# Patient Record
Sex: FEMALE | Race: WHITE | NOT HISPANIC OR LATINO | Employment: OTHER | URBAN - METROPOLITAN AREA
[De-identification: names, ages, dates, MRNs, and addresses within clinical notes are randomized per-mention and may not be internally consistent; named-entity substitution may affect disease eponyms.]

---

## 2023-11-06 DIAGNOSIS — K86.9 PANCREATIC LESION (HHS-HCC): Primary | ICD-10-CM

## 2024-01-05 ENCOUNTER — TELEMEDICINE (OUTPATIENT)
Dept: SURGICAL ONCOLOGY | Facility: CLINIC | Age: 79
End: 2024-01-05
Payer: MEDICARE

## 2024-01-05 DIAGNOSIS — D49.0 IPMN (INTRADUCTAL PAPILLARY MUCINOUS NEOPLASM): Primary | ICD-10-CM

## 2024-01-05 PROCEDURE — 99214 OFFICE O/P EST MOD 30 MIN: CPT | Performed by: PHYSICIAN ASSISTANT

## 2024-01-05 NOTE — PROGRESS NOTES
"Subjective   Ms. 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     In brief, her pancreatic cyst was first identified incidentally in 2019. She had an EUS at that time at [facility] and was told the cyst was \"benign.\" She has been under surveillance since that time.    She recently had a surveillance MRI/MRCP at [facility]. Though I ordered the imaging, I was not sent the report. I did obtain the images and reviewed.      She has been doing well. She reports no recent major illnesses or hospitalizations. She denies abdominal pain, early satiety, poor appetite, unintentional weight loss or jaundice.    She is a  non-secretor.      Past medical history: Asthma, GERD, colon polyps, hypercholesterolemia, hypothyroid.   Surgical history: Left rotator cuff repair. No prior abdominal surgeries.   Family history: No pancreatitis or pancreatic cancer.  Social history: Non-smoker. No alcohol use. No illicits.     Objective     There were no vitals taken for this visit.     Physical Exam  A physical exam was not completed as this was a virtual visit. She is in no acute distress.     Assessment/Plan     PLAN: She has a 1.4 cm multiloculated cyst in the neck of the pancreas. There appears to be a smaller/subcentimeter cyst in the tail of the pancreas. 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     I was not sent the radiology report but reviewed the images personally. I see no significant growth, morphologic change or worrisome features such as ductal dilatation.     I reviewed the recent literature with her regarding discontinuation of pancreatic cyst surveillance (age, stability, etc.). After discussion, she elects to continue surveillance which is reasonable. I will contact her in 1 year.     Amairani Lake PA-C  "

## 2024-11-21 DIAGNOSIS — D49.0 IPMN (INTRADUCTAL PAPILLARY MUCINOUS NEOPLASM): Primary | ICD-10-CM

## 2024-12-12 ENCOUNTER — TELEPHONE (OUTPATIENT)
Dept: HEMATOLOGY/ONCOLOGY | Facility: CLINIC | Age: 79
End: 2024-12-12
Payer: MEDICARE

## 2024-12-12 NOTE — TELEPHONE ENCOUNTER
Called patient to schedule annual MRI patient was unavle to reach the phone. Left a voicemail with my information.    Rubina Coronado MA

## 2025-01-31 ENCOUNTER — TELEMEDICINE (OUTPATIENT)
Dept: SURGICAL ONCOLOGY | Facility: CLINIC | Age: 80
End: 2025-01-31
Payer: MEDICARE

## 2025-01-31 DIAGNOSIS — D49.0 IPMN (INTRADUCTAL PAPILLARY MUCINOUS NEOPLASM): Primary | ICD-10-CM

## 2025-01-31 PROCEDURE — 99214 OFFICE O/P EST MOD 30 MIN: CPT | Performed by: PHYSICIAN ASSISTANT

## 2025-01-31 NOTE — PROGRESS NOTES
"A virtual visit (audio and visual connection) between the patient (at the originating site) and the provider (at the distant site) was utilized to provide this telehealth service.    Verbal consent was requested and obtained from the patient immediately prior to the telehealth visit.     Subjective   Ms. 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      In brief, her pancreatic cyst was first identified incidentally in 2019. She had an EUS at that time at [facility] and was told the cyst was \"benign.\" She has been under surveillance since that time.     Recent surveillance MRI from 1/17/25 ([facility]) shows a stable lobulated 1.6 cm cyst in the neck of the pancreas.      She has been doing well. She reports no recent major illnesses or hospitalizations. She denies abdominal pain, early satiety, poor appetite, unintentional weight loss or jaundice.     She is a  non-secretor.      Past medical history: Asthma, GERD, colon polyps, hypercholesterolemia, hypothyroid.   Surgical history: Left rotator cuff repair. No prior abdominal surgeries.   Family history: No pancreatitis or pancreatic cancer.  Social history: Non-smoker. No alcohol use. No illicits.     Objective     There were no vitals taken for this visit.         Physical Exam  A physical exam was not conducted as this was a phone or virtual visit. The patient is in no acute distress.     Assessment/Plan   Ms. 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 I also see a second subcentimeter cyst (as above) in the tail. There are no high risk stigmata or worrisome features. Will continue annual surveillance. Reassurance provided.       Amairani Lake PA-C  "